# Patient Record
Sex: FEMALE | Race: WHITE | NOT HISPANIC OR LATINO | Employment: FULL TIME | URBAN - METROPOLITAN AREA
[De-identification: names, ages, dates, MRNs, and addresses within clinical notes are randomized per-mention and may not be internally consistent; named-entity substitution may affect disease eponyms.]

---

## 2017-07-11 ENCOUNTER — OFFICE VISIT (OUTPATIENT)
Dept: OBSTETRICS AND GYNECOLOGY | Facility: CLINIC | Age: 52
End: 2017-07-11

## 2017-07-11 VITALS
BODY MASS INDEX: 23.47 KG/M2 | DIASTOLIC BLOOD PRESSURE: 70 MMHG | SYSTOLIC BLOOD PRESSURE: 132 MMHG | WEIGHT: 116.4 LBS | HEIGHT: 59 IN

## 2017-07-11 DIAGNOSIS — Z01.419 ENCOUNTER FOR GYNECOLOGICAL EXAMINATION WITHOUT ABNORMAL FINDING: ICD-10-CM

## 2017-07-11 DIAGNOSIS — N60.11 FIBROCYSTIC BREAST CHANGES, BILATERAL: ICD-10-CM

## 2017-07-11 DIAGNOSIS — N60.12 FIBROCYSTIC BREAST CHANGES, BILATERAL: ICD-10-CM

## 2017-07-11 DIAGNOSIS — Z78.0 MENOPAUSE: Primary | ICD-10-CM

## 2017-07-11 PROCEDURE — 99396 PREV VISIT EST AGE 40-64: CPT | Performed by: OBSTETRICS & GYNECOLOGY

## 2017-07-11 RX ORDER — MONTELUKAST SODIUM 10 MG/1
1 TABLET ORAL AS NEEDED
COMMUNITY
Start: 2017-05-15 | End: 2019-08-01

## 2017-07-11 NOTE — PROGRESS NOTES
"   Chief Complaint   Patient presents with   • Gynecologic Exam       Selina Fernandez is a 51 y.o. year old  presenting to be seen for her annual exam.This patient is menopausal and is not taking estrogen/progestin therapy.  She has previously had tubal sterilization and hysteroscopy with endometrial ablation.  She is followed for stable fibrocystic changes of the breast with benign breast imaging on 7/3/2017.  She has no other gynecologic complaints.   Her mother recently fractured her hip.  SCREENING TESTS    Year 2012   Age                         PAP     Neg.                    HPV high risk                         Mammogram     benign benign                   JADE score                         Breast MRI                         Lipids                         Vitamin D                         Colonoscopy                         DEXA  Frax (hip/any)                         Ovarian Screen                           Enter the month test was performed.  If month not known, enter \"X'  · Black numbers = normal results  · Red numbers = abnormal results  · Black X = patient reported normal  · Red X - patient reported abnormal      Referred by:    Profession:    Other info:        She exercises regularly: yes.  She wears her seat belt: yes.  She has concerns about domestic violence: no.  She has noticed changes in height: no    GYN screening history:  · Last pap: was done on approximately 2016 and the result was: normal PAP.  · Last mammogram: was done on approximately 7/3/2017 and the result was: Birads II (Benign findings)..    No Additional Complaints Reported    The following portions of the patient's history were reviewed and updated as appropriate:vital signs and   She  does not have any pertinent problems on file.  She  has a past surgical history that includes  section; Tubal ligation; " "Hysteroscopy; and Endometrial ablation.  Her family history includes Diabetes in her maternal grandmother and sister; Heart disease in her maternal grandfather and mother; Stroke in her maternal grandmother and mother.  She  reports that she has never smoked. She has never used smokeless tobacco. She reports that she does not drink alcohol or use illicit drugs.  Current Outpatient Prescriptions   Medication Sig Dispense Refill   • aspirin 81 MG EC tablet Take 81 mg by mouth daily.     • montelukast (SINGULAIR) 10 MG tablet Take 1 tablet by mouth As Needed.       No current facility-administered medications for this visit.      She is allergic to codeine; sulfa antibiotics; and penicillins..    Review of Systems  A comprehensive review of systems was taken.  Constitutional: negative for fever, chills, activity change, appetite change, fatigue and unexpected weight change.  Respiratory: negative  Cardiovascular: negative  Gastrointestinal: negative  Genitourinary:negative  Musculoskeletal:negative  Behavioral/Psych: negative       /70  Ht 59\" (149.9 cm)  Wt 116 lb 6.4 oz (52.8 kg)  LMP  (LMP Unknown)  BMI 23.51 kg/m2    Physical Exam    General:  alert; cooperative; well developed; well nourished   Skin:  No suspicious lesions seen   Thyroid: normal to inspection and palpation   Lungs:  clear to auscultation bilaterally   Heart:  regular rate and rhythm, S1, S2 normal, no murmur, click, rub or gallop   Breasts:  Examined in supine position  Symmetric without masses or skin dimpling  Nipples normal without inversion, lesions or discharge  There are no palpable axillary nodes  Fibrocystic changes are present both breasts without a discrete mass   Abdomen: soft, non-tender; no masses  no umbilical or inginual hernias are present  no hepato-splenomegaly   Pelvis: Clinical staff was present for exam  External genitalia:  normal appearance of the external genitalia including Bartholin's and [place]'s " glands.  Vaginal:  normal pink mucosa without prolapse or lesions.  Cervix:  normal appearance.  Uterus:  normal size, shape and consistency. anteverted;  Adnexa:  non palpable bilaterally.  Rectal:  anus visually normal appearing. recto-vaginal exam unremarkable and confirms findings;  Rectocele GRADE 1     Lab Review   No data reviewed    Imaging  Mammogram results- benign         ASSESSMENT  Problems Addressed this Visit        Genitourinary    Menopause - Primary       Other    Fibrocystic breast changes, bilateral      Other Visit Diagnoses     Encounter for gynecological examination without abnormal finding        Relevant Orders    Liquid-based Pap Smear, Screening          PLAN    Medications prescribed this encounter:    New Medications Ordered This Visit   Medications   • montelukast (SINGULAIR) 10 MG tablet     Sig: Take 1 tablet by mouth As Needed.   · Pap test done  · Calcium, 600 mg/ Vit. D, 400 IU daily; regular weight-bearing exercise  · Follow up: 12 month(s)  *Please note that portions of this documentation may have been completed with a voice recognition program.  Efforts were made to edit this dictation, but occasional words may have been mistranscribed.       This note was electronically signed.    KELSY Jacobs MD  July 11, 2017  9:54 AM

## 2018-07-12 ENCOUNTER — OFFICE VISIT (OUTPATIENT)
Dept: OBSTETRICS AND GYNECOLOGY | Facility: CLINIC | Age: 53
End: 2018-07-12

## 2018-07-12 VITALS
DIASTOLIC BLOOD PRESSURE: 80 MMHG | HEIGHT: 59 IN | SYSTOLIC BLOOD PRESSURE: 146 MMHG | WEIGHT: 117 LBS | BODY MASS INDEX: 23.59 KG/M2

## 2018-07-12 DIAGNOSIS — N60.12 FIBROCYSTIC BREAST CHANGES, BILATERAL: ICD-10-CM

## 2018-07-12 DIAGNOSIS — N60.11 FIBROCYSTIC BREAST CHANGES, BILATERAL: ICD-10-CM

## 2018-07-12 DIAGNOSIS — Z01.419 ENCOUNTER FOR GYNECOLOGICAL EXAMINATION WITHOUT ABNORMAL FINDING: ICD-10-CM

## 2018-07-12 DIAGNOSIS — Z78.0 MENOPAUSE: Primary | ICD-10-CM

## 2018-07-12 PROCEDURE — 99396 PREV VISIT EST AGE 40-64: CPT | Performed by: OBSTETRICS & GYNECOLOGY

## 2018-07-12 RX ORDER — LISINOPRIL 2.5 MG/1
1 TABLET ORAL 2 TIMES DAILY
COMMUNITY
Start: 2018-07-05

## 2018-07-12 NOTE — PROGRESS NOTES
Chief Complaint   Patient presents with   • Gynecologic Exam       Selina Fernandez is a 52 y.o. year old  presenting to be seen for her annual exam.This patient is menopausal and does not use estrogen/progestin therapy.  She denies menopausal symptoms and denies symptoms of vaginal atrophy.  She has had tubal sterilization and a hysteroscopy/D&C/NovaSure endometrial ablation procedure.  She has no postmenopausal bleeding.  She denies bowel or urinary symptoms.    SCREENING TESTS    Year 2012   Age                         PAP      Neg.                   HPV high risk                         Mammogram      benign                   JADE score                         Breast MRI                         Lipids                         Vitamin D                         Colonoscopy                         DEXA  Frax (hip/any)                         Ovarian Screen                             She exercises regularly: yes.  She wears her seat belt: yes.  She has concerns about domestic violence: no.  She has noticed changes in height: no    GYN screening history:  · Last pap: was done on approximately 2017 and the result was: normal PAP.  · Last mammogram: was done on approximately 7/3/2017 and the result was: Birads II (Benign findings)..    No Additional Complaints Reported    The following portions of the patient's history were reviewed and updated as appropriate:vital signs and   She  does not have any pertinent problems on file.  She  has a past surgical history that includes  section; Tubal ligation; Hysteroscopy; and Endometrial ablation.  Her family history includes Diabetes in her maternal grandmother and sister; Heart disease in her maternal grandfather and mother; Stroke in her maternal grandmother and mother.  She  reports that she has never smoked. She has never used smokeless tobacco. She  "reports that she does not drink alcohol or use drugs.  Current Outpatient Prescriptions   Medication Sig Dispense Refill   • aspirin 81 MG EC tablet Take 81 mg by mouth daily.     • lisinopril (PRINIVIL,ZESTRIL) 2.5 MG tablet Take 1 tablet by mouth Daily.     • montelukast (SINGULAIR) 10 MG tablet Take 1 tablet by mouth As Needed.       No current facility-administered medications for this visit.      She is allergic to codeine; sulfa antibiotics; and penicillins..    Review of Systems  A comprehensive review of systems was taken.  Constitutional: negative for fever, chills, activity change, appetite change, fatigue and unexpected weight change.  Respiratory: negative  Cardiovascular: negative  Gastrointestinal: negative  Genitourinary:negative  Musculoskeletal:negative  Behavioral/Psych: negative       /80   Ht 149.9 cm (59\")   Wt 53.1 kg (117 lb)   LMP  (LMP Unknown) Comment: S/P NOVASURE ABLATION  BMI 23.63 kg/m²     Physical Exam    General:  alert; cooperative; well developed; well nourished   Skin:  No suspicious lesions seen   Thyroid: normal to inspection and palpation   Lungs:  clear to auscultation bilaterally   Heart:  regular rate and rhythm, S1, S2 normal, no murmur, click, rub or gallop   Breasts:  Examined in supine position  Symmetric without masses or skin dimpling  Nipples normal without inversion, lesions or discharge  There are no palpable axillary nodes  Fibrocystic changes are present both breasts without a discrete mass   Abdomen: soft, non-tender; no masses  no umbilical or inginual hernias are present  no hepato-splenomegaly   Pelvis: Clinical staff was present for exam  External genitalia:  normal appearance of the external genitalia including Bartholin's and [place]'s glands.  Vaginal:  normal pink mucosa without prolapse or lesions.  Cervix:  normal appearance.  Uterus:  normal size, shape and consistency. anteverted;  Adnexa:  non palpable bilaterally.  Rectal:  anus visually " normal appearing. recto-vaginal exam unremarkable and confirms findings;     Lab Review   No data reviewed    Imaging  Mammogram results- Birads II         ASSESSMENT  Problems Addressed this Visit        Genitourinary    Menopause - Primary       Other    Fibrocystic breast changes, bilateral      Other Visit Diagnoses     Encounter for gynecological examination without abnormal finding              PLAN    · Medications prescribed this encounter:  No orders of the defined types were placed in this encounter.  · Monthly self breast assessment and annual breast imaging  · Calcium, 600 mg/ Vit. D, 400 IU daily; regular weight-bearing exercise  · Follow up: 12 month(s)  *Please note that portions of this documentation may have been completed with a voice recognition program.  Efforts were made to edit this dictation, but occasional words may have been mistranscribed.       This note was electronically signed.    KELSY Jacobs MD  July 12, 2018  10:20 AM

## 2019-08-01 ENCOUNTER — OFFICE VISIT (OUTPATIENT)
Dept: OBSTETRICS AND GYNECOLOGY | Facility: CLINIC | Age: 54
End: 2019-08-01

## 2019-08-01 VITALS
BODY MASS INDEX: 24.03 KG/M2 | WEIGHT: 119.2 LBS | DIASTOLIC BLOOD PRESSURE: 64 MMHG | HEIGHT: 59 IN | SYSTOLIC BLOOD PRESSURE: 118 MMHG

## 2019-08-01 DIAGNOSIS — Z78.0 MENOPAUSE: Primary | ICD-10-CM

## 2019-08-01 DIAGNOSIS — N60.12 FIBROCYSTIC BREAST CHANGES, BILATERAL: ICD-10-CM

## 2019-08-01 DIAGNOSIS — N60.11 FIBROCYSTIC BREAST CHANGES, BILATERAL: ICD-10-CM

## 2019-08-01 DIAGNOSIS — Z01.419 ENCOUNTER FOR GYNECOLOGICAL EXAMINATION WITHOUT ABNORMAL FINDING: ICD-10-CM

## 2019-08-01 PROCEDURE — 99396 PREV VISIT EST AGE 40-64: CPT | Performed by: OBSTETRICS & GYNECOLOGY

## 2019-08-01 RX ORDER — HEPATITIS A VACCINE 1440 [IU]/ML
INJECTION, SUSPENSION INTRAMUSCULAR
Refills: 0 | COMMUNITY
Start: 2019-06-21

## 2019-08-01 NOTE — PROGRESS NOTES
Chief Complaint   Patient presents with   • Gynecologic Exam       Selina Fernandez is a 53 y.o. year old  presenting to be seen for her annual exam.  Shunt has a prior history of a  section, tubal sterilization, and a hysteroscopy/D&C/NovaSure endometrial ablation.  She has no postmenopausal bleeding.  She denies menopausal symptoms.  She denies bowel or urinary symptoms.    SCREENING TESTS    Year 2012   Age                         PAP      Neg.                   HPV high risk                         Mammogram        benign                 JADE score                         Breast MRI                         Lipids                         Vitamin D                         Colonoscopy                         DEXA  Frax (hip/any)                         Ovarian Screen                             She exercises regularly: yes.  She wears her seat belt: yes.  She has concerns about domestic violence: no.  She has noticed changes in height: no    GYN screening history:  · Last pap: was done on approximately 2017 and the result was: normal PAP.  · Last mammogram: was done on approximately 7/15/2019 and the result was: Birads II (Benign findings)..    No Additional Complaints Reported    The following portions of the patient's history were reviewed and updated as appropriate:vital signs and   She  has a past medical history of Enterocele, Fibrocystic breast changes, bilateral, Hypertension, Menopause, and Rectocele.  She does not have any pertinent problems on file.  She  has a past surgical history that includes  section; Tubal ligation; Hysteroscopy; and Endometrial ablation.  Her family history includes Diabetes in her maternal grandmother and sister; Heart disease in her maternal grandfather and mother; Stroke in her maternal grandmother and mother.  She  reports that she has never  "smoked. She has never used smokeless tobacco. She reports that she does not drink alcohol or use drugs.  Current Outpatient Medications   Medication Sig Dispense Refill   • aspirin 81 MG EC tablet Take 81 mg by mouth daily.     • HAVRIX 1440 EL U/ML vaccine inject 1 milliliter intramuscularly  0   • lisinopril (PRINIVIL,ZESTRIL) 2.5 MG tablet Take 1 tablet by mouth Daily.       No current facility-administered medications for this visit.      She is allergic to codeine; sulfa antibiotics; and penicillins..    Review of Systems  A comprehensive review of systems was taken.  Constitutional: negative for fever, chills, activity change, appetite change, fatigue and unexpected weight change.  Respiratory: negative  Cardiovascular: negative  Gastrointestinal: negative  Genitourinary:negative  Musculoskeletal:negative  Behavioral/Psych: negative       /64   Ht 149.9 cm (59\")   Wt 54.1 kg (119 lb 3.2 oz)   LMP  (LMP Unknown) Comment: S/P NOVASURE ABLATION  Breastfeeding? No   BMI 24.08 kg/m²     Physical Exam    General:  alert; cooperative; well developed; well nourished   Skin:  No suspicious lesions seen   Thyroid: normal to inspection and palpation   Lungs:  clear to auscultation bilaterally   Heart:  regular rate and rhythm, S1, S2 normal, no murmur, click, rub or gallop   Breasts:  Examined in supine position  Symmetric without masses or skin dimpling  Nipples normal without inversion, lesions or discharge  There are no palpable axillary nodes  Fibrocystic changes are present both breasts without a discrete mass   Abdomen: soft, non-tender; no masses  no umbilical or inguinal hernias are present  no hepato-splenomegaly   Pelvis: Clinical staff was present for exam  External genitalia:  normal appearance of the external genitalia including Bartholin's and [place]'s glands.  Vaginal:  normal pink mucosa without prolapse or lesions.  Cervix:  normal appearance.  Uterus:  normal size, shape and consistency. " anteverted;  Adnexa:  non palpable bilaterally.  Rectal:  anus visually normal appearing. recto-vaginal exam unremarkable and confirms findings;     Lab Review   No data reviewed    Imaging  Mammogram results- Birads II         ASSESSMENT  Problems Addressed this Visit        Genitourinary    Menopause - Primary       Other    Fibrocystic breast changes, bilateral      Other Visit Diagnoses     Encounter for gynecological examination without abnormal finding              PLAN    · Medications prescribed this encounter:  No orders of the defined types were placed in this encounter.  · Monthly self breast assessment and annual breast imaging  · Calcium, 600 mg/ Vit. D, 400 IU daily; regular weight-bearing exercise  · Follow up: 12 month(s)  *Please note that portions of this documentation may have been completed with a voice recognition program.  Efforts were made to edit this dictation, but occasional words may have been mistranscribed.       This note was electronically signed.    KELSY Jacobs MD  August 1, 2019  9:45 AM

## 2020-08-04 ENCOUNTER — OFFICE VISIT (OUTPATIENT)
Dept: OBSTETRICS AND GYNECOLOGY | Facility: CLINIC | Age: 55
End: 2020-08-04

## 2020-08-04 VITALS
HEIGHT: 59 IN | WEIGHT: 118.2 LBS | SYSTOLIC BLOOD PRESSURE: 160 MMHG | TEMPERATURE: 97.3 F | BODY MASS INDEX: 23.83 KG/M2 | DIASTOLIC BLOOD PRESSURE: 80 MMHG

## 2020-08-04 DIAGNOSIS — N60.11 FIBROCYSTIC BREAST CHANGES, BILATERAL: ICD-10-CM

## 2020-08-04 DIAGNOSIS — N60.12 FIBROCYSTIC BREAST CHANGES, BILATERAL: ICD-10-CM

## 2020-08-04 DIAGNOSIS — Z78.0 MENOPAUSE: Primary | ICD-10-CM

## 2020-08-04 DIAGNOSIS — Z01.419 ENCOUNTER FOR GYNECOLOGICAL EXAMINATION WITHOUT ABNORMAL FINDING: ICD-10-CM

## 2020-08-04 PROCEDURE — 99396 PREV VISIT EST AGE 40-64: CPT | Performed by: OBSTETRICS & GYNECOLOGY

## 2020-08-04 NOTE — PROGRESS NOTES
Chief Complaint   Patient presents with   • Annual Exam       Selina Fernandez is a 54 y.o. year old  presenting to be seen for her annual exam.  This patient has previously had  sections and tubal sterilization.  I have done a hysteroscopy/D&C/endometrial ablation procedure on her.  She is menopausal and does not use estrogen/progestin replacement therapy.  She denies menopausal symptoms.  She denies bowel or urinary symptoms.    SCREENING TESTS    Year 2012   Age                         PAP      Neg.                   HPV high risk                         Mammogram        benign benign                JADE score                         Breast MRI                         Lipids                         Vitamin D                         Colonoscopy                         DEXA  Frax (hip/any)                         Ovarian Screen                             She exercises regularly. yes  She wears her seat belt: yes.  She has concerns about domestic violence: no.  She has noticed changes in height: no    GYN screening history:  · Last mammogram: was done on approximately 2020 and the result was: Birads II (Benign findings)..    No Additional Complaints Reported    The following portions of the patient's history were reviewed and updated as appropriate:vital signs and   She  has a past medical history of Enterocele, Fibrocystic breast changes, bilateral, Hypertension, Menopause, and Rectocele.  She does not have any pertinent problems on file.  She  has a past surgical history that includes  section; Tubal ligation; Hysteroscopy; and Endometrial ablation.  Her family history includes Diabetes in her maternal grandmother and sister; Heart disease in her maternal grandfather and mother; Stroke in her maternal grandmother and mother.  She  reports that she has never smoked. She has never used  "smokeless tobacco. She reports that she does not drink alcohol or use drugs.  Current Outpatient Medications   Medication Sig Dispense Refill   • Multiple Vitamins-Minerals (MULTIVITAMIN ADULTS 50+ PO) Take 1 tablet by mouth Daily.     • HAVRIX 1440 EL U/ML vaccine inject 1 milliliter intramuscularly  0   • lisinopril (PRINIVIL,ZESTRIL) 2.5 MG tablet Take 1 tablet by mouth Daily.       No current facility-administered medications for this visit.      She is allergic to codeine; sulfa antibiotics; and penicillins..    Review of Systems  A review of systems was taken.  She denies cough, fever, shortness of breath, and any loss of sense of taste or smell.  Constitutional: negative for fever, chills, activity change, appetite change, fatigue and unexpected weight change.  Respiratory: negative  Cardiovascular: negative  Gastrointestinal: negative  Genitourinary:negative  Musculoskeletal:negative  Behavioral/Psych: negative       /80   Temp 97.3 °F (36.3 °C)   Ht 149.9 cm (59\")   Wt 53.6 kg (118 lb 3.2 oz)   LMP  (LMP Unknown) Comment: S/P NOVASURE ABLATION  Breastfeeding No   BMI 23.87 kg/m²     Physical Exam    General:  alert; cooperative; well developed; well nourished   Skin:  No suspicious lesions seen   Thyroid: normal to inspection and palpation   Lungs:  clear to auscultation bilaterally   Heart:  regular rate and rhythm, S1, S2 normal, no murmur, click, rub or gallop   Breasts:  Examined in supine position  Symmetric without masses or skin dimpling  Nipples normal without inversion, lesions or discharge  There are no palpable axillary nodes   Abdomen: soft, non-tender; no masses  no umbilical or inguinal hernias are present  no hepato-splenomegaly   Pelvis: Clinical staff was present for exam  External genitalia:  normal appearance of the external genitalia including Bartholin's and [place]'s glands.  Vaginal:  normal pink mucosa without prolapse or lesions.  Cervix:  normal appearance.  Uterus:  " normal size, shape and consistency. anteverted;  Adnexa:  non palpable bilaterally.  Rectal:  anus visually normal appearing. recto-vaginal exam unremarkable and confirms findings;     Lab Review   No data reviewed    Imaging  Mammogram results         ASSESSMENT  Problems Addressed this Visit        Genitourinary    Menopause - Primary       Other    Fibrocystic breast changes, bilateral      Other Visit Diagnoses     Encounter for gynecological examination without abnormal finding        Relevant Orders    Liquid-based Pap Smear, Screening              Substance History:   reports that she has never smoked. She has never used smokeless tobacco.   reports that she does not drink alcohol.   reports that she does not use drugs.    Substance use counseling is not indicated based on patient history.      PLAN    · Medications prescribed this encounter:  No orders of the defined types were placed in this encounter.  · Monthly self breast assessment and annual breast imaging  · Calcium, 600 mg/ Vit. D, 400 IU daily; regular weight-bearing exercise  · Follow up: 12 month(s)  *Please note that portions of this documentation may have been completed with a voice recognition program.  Efforts were made to edit this dictation, but occasional words may have been mistranscribed.       This note was electronically signed.    KELSY Jacobs MD  August 4, 2020  09:58

## 2021-08-09 ENCOUNTER — OFFICE VISIT (OUTPATIENT)
Dept: OBSTETRICS AND GYNECOLOGY | Facility: CLINIC | Age: 56
End: 2021-08-09

## 2021-08-09 VITALS
BODY MASS INDEX: 23.18 KG/M2 | SYSTOLIC BLOOD PRESSURE: 122 MMHG | WEIGHT: 115 LBS | DIASTOLIC BLOOD PRESSURE: 70 MMHG | HEIGHT: 59 IN

## 2021-08-09 DIAGNOSIS — Z78.0 MENOPAUSE: ICD-10-CM

## 2021-08-09 DIAGNOSIS — N60.11 FIBROCYSTIC BREAST CHANGES, BILATERAL: ICD-10-CM

## 2021-08-09 DIAGNOSIS — N60.12 FIBROCYSTIC BREAST CHANGES, BILATERAL: ICD-10-CM

## 2021-08-09 DIAGNOSIS — Z01.419 ENCOUNTER FOR GYNECOLOGICAL EXAMINATION WITHOUT ABNORMAL FINDING: Primary | ICD-10-CM

## 2021-08-09 PROCEDURE — 99396 PREV VISIT EST AGE 40-64: CPT | Performed by: OBSTETRICS & GYNECOLOGY

## 2021-08-09 RX ORDER — ATORVASTATIN CALCIUM 10 MG/1
1 TABLET, FILM COATED ORAL DAILY
COMMUNITY
Start: 2021-08-03

## 2021-08-09 NOTE — PROGRESS NOTES
Chief Complaint   Patient presents with   • Annual Exam       Selina Fernandez is a 55 y.o. year old  presenting to be seen for her annual exam.  This patient has had 2  sections, tubal sterilization, and I did a hysteroscopy/D&C/endometrial ablation procedure for menorrhagia.  She has no gynecologic complaints.  She denies bowel or urinary symptoms.  She has no postmenopausal bleeding.  She has had Manisha-19 immunization.    SCREENING TESTS    Year 2012   Age                         PAP         Neg.                HPV high risk                         Mammogram         benign                JADE score                         Breast MRI                         Lipids                         Vitamin D                         Colonoscopy                         DEXA  Frax (hip/any)                         Ovarian Screen                             She exercises regularly: yes.  She wears her seat belt: yes.  She has concerns about domestic violence: no.  She has noticed changes in height: no    GYN screening history:  · Last pap: was done on approximately 2020 and the result was: normal PAP.  · Last mammogram: was done on approximately 2020 and the result was: Birads I (Normal)..    No Additional Complaints Reported    The following portions of the patient's history were reviewed and updated as appropriate:vital signs and   She  has a past medical history of Enterocele, Fibrocystic breast changes, bilateral, Hyperlipidemia, Hypertension, Menopause, and Rectocele.  She does not have any pertinent problems on file.  She  has a past surgical history that includes  section; Tubal ligation; Hysteroscopy; and Endometrial ablation.  Her family history includes Diabetes in her maternal grandmother and sister; Heart disease in her maternal grandfather and mother; Stroke in her maternal  "grandmother and mother.  She  reports that she has never smoked. She has never used smokeless tobacco. She reports that she does not drink alcohol and does not use drugs.  Current Outpatient Medications   Medication Sig Dispense Refill   • atorvastatin (LIPITOR) 10 MG tablet Take 1 tablet by mouth Daily.     • HAVRIX 1440 EL U/ML vaccine inject 1 milliliter intramuscularly  0   • lisinopril (PRINIVIL,ZESTRIL) 2.5 MG tablet Take 1 tablet by mouth 2 (two) times a day.     • Multiple Vitamins-Minerals (MULTIVITAMIN ADULTS 50+ PO) Take 1 tablet by mouth Daily.       No current facility-administered medications for this visit.     She is allergic to codeine, sulfa antibiotics, and penicillins..    Review of Systems  A review of systems was taken.  Constitutional: negative for fever, chills, activity change, appetite change, fatigue and unexpected weight change.  Respiratory: negative  Cardiovascular: negative  Gastrointestinal: negative  Genitourinary:negative  Musculoskeletal:negative  Behavioral/Psych: negative     Counseling/Anticipatory Guidance Discussed: nutrition, physical activity, healthy weight, immunizations, screenings and self-breast exam      /70   Ht 149.9 cm (59\")   Wt 52.2 kg (115 lb)   LMP  (LMP Unknown) Comment: S/P NOVASURE ABLATION  Breastfeeding No   BMI 23.23 kg/m²     BMI reviewed     MEDICALLY INDICATED   Physical Exam    Neuro: alert and oriented to person, place and time    General:  alert; cooperative; well developed; well nourished   Skin:  No suspicious lesions seen   Thyroid: normal to inspection and palpation   Lungs:  breathing is unlabored  clear to auscultation bilaterally   Heart:  regular rate and rhythm, S1, S2 normal, no murmur, click, rub or gallop  normal apical impulse   Breasts:  Examined in supine position  Symmetric without masses or skin dimpling  Nipples normal without inversion, lesions or discharge  There are no palpable axillary nodes  Fibrocystic changes are " present both breasts without a discrete mass   Abdomen: soft, non-tender; no masses  no umbilical or inguinal hernias are present  no hepato-splenomegaly   Pelvis: Clinical staff was present for exam  External genitalia:  normal appearance of the external genitalia including Bartholin's and [place]'s glands.  Vaginal:  normal pink mucosa without prolapse or lesions.  Cervix:  normal appearance.  Uterus:  normal size, shape and consistency. anteverted;  Adnexa:  non palpable bilaterally.  Rectal:  anus visually normal appearing. recto-vaginal exam unremarkable and confirms findings;     Lab Review   No data reviewed    Imaging  Mammogram results              ASSESSMENT  Problems Addressed this Visit        Genitourinary and Reproductive     Menopause    Fibrocystic breast changes, bilateral      Other Visit Diagnoses     Encounter for gynecological examination without abnormal finding    -  Primary      Diagnoses       Codes Comments    Encounter for gynecological examination without abnormal finding    -  Primary ICD-10-CM: Z01.419  ICD-9-CM: V72.31     Menopause     ICD-10-CM: Z78.0  ICD-9-CM: 627.2     Fibrocystic breast changes, bilateral     ICD-10-CM: N60.11, N60.12  ICD-9-CM: 610.1               Substance History:   reports that she has never smoked. She has never used smokeless tobacco.   reports no history of alcohol use.   reports no history of drug use.    Substance use counseling is not indicated based on patient history.      PLAN    · Medications prescribed this encounter:  No orders of the defined types were placed in this encounter.  · Monthly self breast assessment and annual breast imaging  · Calcium, 600 mg/ Vit. D, 400 IU daily; regular weight-bearing exercise  · Follow up: 12 month(s)  *Please note that portions of this documentation may have been completed with a voice recognition program.  Efforts were made to edit this dictation, but occasional words may have been mistranscribed.       This note  was electronically signed.    KELSY Jacobs MD  August 9, 2021  10:35 EDT

## 2022-09-06 ENCOUNTER — OFFICE VISIT (OUTPATIENT)
Dept: OBSTETRICS AND GYNECOLOGY | Facility: CLINIC | Age: 57
End: 2022-09-06

## 2022-09-06 VITALS
SYSTOLIC BLOOD PRESSURE: 148 MMHG | HEIGHT: 59 IN | DIASTOLIC BLOOD PRESSURE: 74 MMHG | BODY MASS INDEX: 22.42 KG/M2 | WEIGHT: 111.2 LBS

## 2022-09-06 DIAGNOSIS — Z78.0 MENOPAUSE: ICD-10-CM

## 2022-09-06 DIAGNOSIS — Z01.419 ENCOUNTER FOR GYNECOLOGICAL EXAMINATION WITHOUT ABNORMAL FINDING: Primary | ICD-10-CM

## 2022-09-06 PROCEDURE — 99396 PREV VISIT EST AGE 40-64: CPT | Performed by: NURSE PRACTITIONER

## 2022-09-06 RX ORDER — FLUTICASONE PROPIONATE 50 MCG
1 SPRAY, SUSPENSION (ML) NASAL AS NEEDED
COMMUNITY
Start: 2022-06-17

## 2022-09-06 RX ORDER — MONTELUKAST SODIUM 10 MG/1
1 TABLET ORAL DAILY
COMMUNITY
Start: 2022-08-04

## 2022-09-06 NOTE — PROGRESS NOTES
Chief Complaint  Selina Fernandez is a 56 y.o.  female presenting for Annual Exam    History of Present Illness  Selina is a very pleasant 55yo woman, , a longtime pt of Dr. Patel, here today for annual gyn exam.  Her surgical hx is listed below, and it includes CS delivery x 2, BTL, and an endometrial ablation.  She is menopausal now, and now bothered too much with any vasomotor symptoms.  She does note just a little vaginal dryness, but OTC lubricants help SA to be comfortable enough.  (She declines vaginal estrogen tx at this time.)  She is intentional re: calcium and vitamin D intake, and walking for exercise.  HTN is well controlled; lipids well controlled with a statin.  She had screening mammogram in [facility] at the appropriate time in 2022; she had additional views and an ultrasound follow up at  in August.  We got those records this morning, and she was cleared to go back to annual screening.  Other preventive health procedures are up to date as well.  Otherwise, ROS negative.    The following portions of the patient's history were reviewed and updated as appropriate: allergies, current medications, past family history, past medical history, past social history, past surgical history and problem list.    Allergies   Allergen Reactions   • Codeine Nausea And Vomiting   • Sulfa Antibiotics Nausea Only   • Penicillins Rash         Current Outpatient Medications:   •  atorvastatin (LIPITOR) 10 MG tablet, Take 1 tablet by mouth Daily., Disp: , Rfl:   •  fluticasone (FLONASE) 50 MCG/ACT nasal spray, 1 spray by Each Nare route As Needed., Disp: , Rfl:   •  HAVRIX 1440 EL U/ML vaccine, inject 1 milliliter intramuscularly, Disp: , Rfl: 0  •  lisinopril (PRINIVIL,ZESTRIL) 2.5 MG tablet, Take 1 tablet by mouth 2 (two) times a day., Disp: , Rfl:   •  montelukast (SINGULAIR) 10 MG tablet, Take 1 tablet by mouth Daily., Disp: , Rfl:   •  Multiple Vitamins-Minerals (MULTIVITAMIN ADULTS 50+ PO),  "Take 1 tablet by mouth Daily., Disp: , Rfl:     Past Medical History:   Diagnosis Date   • Asthma    • Enterocele    • Fibrocystic breast changes, bilateral    • Hyperlipidemia    • Hypertension    • Menopause    • Rectocele         Past Surgical History:   Procedure Laterality Date   •  SECTION     • ENDOMETRIAL ABLATION     • HYSTEROSCOPY     • TUBAL ABDOMINAL LIGATION         Objective  /74   Ht 149.9 cm (59\")   Wt 50.4 kg (111 lb 3.2 oz)   LMP  (LMP Unknown) Comment: S/P NOVASURE ABLATION  Breastfeeding No   BMI 22.46 kg/m²     Physical Exam  Exam conducted with a chaperone present.   Constitutional:       Appearance: Normal appearance.   HENT:      Head: Normocephalic.   Neck:      Thyroid: No thyroid mass or thyromegaly.   Cardiovascular:      Rate and Rhythm: Normal rate and regular rhythm.      Heart sounds: Normal heart sounds.   Pulmonary:      Effort: Pulmonary effort is normal.      Breath sounds: Normal breath sounds.   Chest:   Breasts:      Right: No inverted nipple, mass, nipple discharge, axillary adenopathy or supraclavicular adenopathy.      Left: No inverted nipple, mass, nipple discharge, axillary adenopathy or supraclavicular adenopathy.       Abdominal:      Palpations: Abdomen is soft. There is no mass.      Tenderness: There is no abdominal tenderness.   Genitourinary:     General: Normal vulva.      Labia:         Right: No rash, tenderness or lesion.         Left: No rash, tenderness or lesion.       Urethra: No prolapse.      Vagina: Erythema present. No vaginal discharge.      Cervix: No discharge, lesion or erythema.      Uterus: Not enlarged and not tender.       Adnexa:         Right: No mass or tenderness.          Left: No mass or tenderness.        Comments: Mild atrophic erythema; no abnormal discharge.  Anus appears wnl.  No rectal exam performed.  Lymphadenopathy:      Upper Body:      Right upper body: No supraclavicular or axillary adenopathy.      Left " upper body: No supraclavicular or axillary adenopathy.   Neurological:      Mental Status: She is alert.         Assessment/Plan   Diagnoses and all orders for this visit:    1. Encounter for gynecological examination without abnormal finding (Primary)    2. Menopause        Procedures    40 to 64: Counseling/Anticipatory Guidance Discussed: nutrition, physical activity, screenings and self-breast exam    Return in about 1 year (around 9/6/2023) for Annual physical.    Gwendolyn Restrepo, ESTELLA  09/06/2022

## 2023-12-14 ENCOUNTER — OFFICE VISIT (OUTPATIENT)
Dept: OBSTETRICS AND GYNECOLOGY | Facility: CLINIC | Age: 58
End: 2023-12-14
Payer: COMMERCIAL

## 2023-12-14 VITALS
SYSTOLIC BLOOD PRESSURE: 118 MMHG | DIASTOLIC BLOOD PRESSURE: 70 MMHG | RESPIRATION RATE: 14 BRPM | BODY MASS INDEX: 24.16 KG/M2 | WEIGHT: 119.6 LBS

## 2023-12-14 DIAGNOSIS — Z01.419 ENCOUNTER FOR GYNECOLOGICAL EXAMINATION WITHOUT ABNORMAL FINDING: ICD-10-CM

## 2023-12-14 DIAGNOSIS — Z01.419 WELL WOMAN EXAM WITH ROUTINE GYNECOLOGICAL EXAM: Primary | ICD-10-CM

## 2023-12-14 RX ORDER — ALBUTEROL SULFATE 90 UG/1
2 AEROSOL, METERED RESPIRATORY (INHALATION) EVERY 6 HOURS PRN
COMMUNITY
Start: 2023-11-08

## 2023-12-14 RX ORDER — LISINOPRIL 5 MG/1
5 TABLET ORAL 2 TIMES DAILY
COMMUNITY
Start: 2023-11-14

## 2023-12-14 NOTE — PROGRESS NOTES
Chief Complaint  Selina Fernandez is a 58 y.o.  female presenting for Gynecologic Exam (Annual, no c/c)    History of Present Illness  Selina is a 59 yo woman, , here for annual gyn exam.  Her past surgical history includes  section, bilateral tubal sterilization, and a hysteroscopy with endometrial ablation.      Her last pap 2020, wnl  Last mammogram 8/15/23, negative  Last colonoscopy  ([facility]), to repeat 10 years    The following portions of the patient's history were reviewed and updated as appropriate: allergies, current medications, past family history, past medical history, past social history, past surgical history, and problem list.    Allergies   Allergen Reactions    Codeine Nausea And Vomiting    Sulfa Antibiotics Nausea Only    Penicillins Rash         Current Outpatient Medications:     albuterol sulfate  (90 Base) MCG/ACT inhaler, Inhale 2 puffs Every 6 (Six) Hours As Needed., Disp: , Rfl:     fluticasone (FLONASE) 50 MCG/ACT nasal spray, 1 spray by Each Nare route As Needed., Disp: , Rfl:     lisinopril (PRINIVIL,ZESTRIL) 5 MG tablet, Take 1 tablet by mouth 2 (Two) Times a Day., Disp: , Rfl:     montelukast (SINGULAIR) 10 MG tablet, Take 1 tablet by mouth Daily., Disp: , Rfl:     Multiple Vitamins-Minerals (MULTIVITAMIN ADULTS 50+ PO), Take 1 tablet by mouth Daily., Disp: , Rfl:     Past Medical History:   Diagnosis Date    Asthma     Enterocele     Fibrocystic breast changes, bilateral     Gestational diabetes     Hyperlipidemia     Hypertension     Menopause     Rectocele     Varicella 1970        Past Surgical History:   Procedure Laterality Date     SECTION      ENDOMETRIAL ABLATION      HYSTEROSCOPY      TUBAL ABDOMINAL LIGATION         Objective  /70 (BP Location: Right arm, Patient Position: Sitting, Cuff Size: Adult)   Resp 14   Wt 54.3 kg (119 lb 9.6 oz)   LMP  (LMP Unknown) Comment: S/P NOVASURE ABLATION  BMI 24.16 kg/m²     Physical  Exam  Vitals and nursing note reviewed. Exam conducted with a chaperone present.   Constitutional:       General: She is not in acute distress.     Appearance: Normal appearance. She is not ill-appearing.   HENT:      Head: Normocephalic.   Neck:      Thyroid: No thyroid mass or thyromegaly.   Cardiovascular:      Rate and Rhythm: Normal rate and regular rhythm.      Heart sounds: Normal heart sounds. No murmur heard.  Pulmonary:      Effort: Pulmonary effort is normal. No respiratory distress.      Breath sounds: Normal breath sounds.   Chest:   Breasts:     Right: No inverted nipple, mass or nipple discharge.      Left: No inverted nipple, mass or nipple discharge.   Abdominal:      Palpations: Abdomen is soft. There is no mass.      Tenderness: There is no abdominal tenderness.   Genitourinary:     General: Normal vulva.      Labia:         Right: No rash, tenderness or lesion.         Left: No rash, tenderness or lesion.       Vagina: Normal. No erythema.      Cervix: No discharge, lesion or erythema.      Uterus: Not enlarged and not tender.       Adnexa:         Right: No mass or tenderness.          Left: No mass or tenderness.        Comments: Anus appears wnl.  No rectal exam performed.  Lymphadenopathy:      Upper Body:      Right upper body: No supraclavicular or axillary adenopathy.      Left upper body: No supraclavicular or axillary adenopathy.   Skin:     General: Skin is warm and dry.   Neurological:      Mental Status: She is alert and oriented to person, place, and time.   Psychiatric:         Mood and Affect: Mood normal.         Behavior: Behavior normal.         Assessment/Plan   Diagnoses and all orders for this visit:    1. Well woman exam with routine gynecological exam (Primary)  -     LIQUID-BASED PAP SMEAR WITH HPV GENOTYPING REGARDLESS OF INTERPRETATION ([facility]); Future  -     LIQUID-BASED PAP SMEAR WITH HPV GENOTYPING REGARDLESS OF INTERPRETATION ([facility])    2. Encounter for  gynecological examination without abnormal finding        Procedures    40 to 64: Counseling/Anticipatory Guidance Discussed: nutrition, physical activity, screenings, and self-breast exam    Return in about 1 year (around 12/14/2024) for Annual physical.    Gwendolyn Restrepo, APRN  12/14/2023

## 2023-12-20 LAB — REF LAB TEST METHOD: NORMAL

## 2025-03-31 NOTE — PROGRESS NOTES
"Subjective   Chief Complaint   Patient presents with    Gynecologic Exam     Annual.     Selina Fernandez is a 59 y.o. year old  menopausal female presenting to be seen for her annual exam.  She is doing well and has no complaints.     This past year she has not been on hormone replacement therapy.  She has not had any vaginal bleeding in the last 12 months.  Menopausal symptoms are not present.    Mammogram last year: patient supplied records, normal mammogram 24   Colonoscopy due    Last pap 2023: NILM, HPV negative     SEXUAL Hx:  She is currently sexually active.  In the past year there there has been NO new sexual partners.    Condoms are never used.  She would not like to be screened for STD's at today's exam.  Louann is painful: no  HEALTH Hx:  She exercises regularly: yes. Keeps her grandchildren 2 days a week! And still walks 3 miles 2-3x a week.   She wears her seat belt: yes.  She has concerns about domestic violence: no.    The following portions of the patient's history were reviewed and updated as appropriate:problem list, current medications, allergies, past family history, past medical history, past social history, and past surgical history.    Social History    Tobacco Use      Smoking status: Never      Smokeless tobacco: Never         Objective   /72 (BP Location: Left arm, Patient Position: Sitting, Cuff Size: Adult)   Ht 149.9 cm (59\")   Wt 54.1 kg (119 lb 3.2 oz)   Breastfeeding No   BMI 24.08 kg/m²     General:  well developed; well nourished  no acute distress  mentation appropriate   Breasts:  Examined in supine position  Symmetric without masses or skin dimpling  Nipples normal without inversion, lesions or discharge  There are no palpable axillary nodes   Pelvis: Clinical staff was present for exam  External genitalia:  normal appearance of the external genitalia including Bartholin's and [place]'s glands.  :  urethral meatus normal; urethra normal:  Vaginal: "  atrophic mucosal changes are present;  Cervix:  normal appearance.  Uterus:  normal size, shape and consistency.  Adnexa:  normal bimanual exam of the adnexa.  Rectal:  digital rectal exam not performed; anus visually normal appearing.        Assessment   Annual GYN exam   Vaginal atrophy   Menopausal female currently not on HRT - without significant symptoms affecting activities of daily living  She is up to date on all relevant gynecologic and colorectal screenings     Plan   Pap was not done today.  I explained to Selina that the recommendations for Pap smear interval in a low risk patient has lengthened to 5 years time.  I told Selina she still needs to be seen in our office yearly for a full physical including breast and pelvic exam.   She was encouraged to get yearly mammograms.  She should report any palpable breast lump(s) or skin changes regardless of mammographic findings.  I explained to Selina that notification regarding her mammogram results will come from the center performing the study.  Our office will not be routinely calling with mammogram results.  It is her responsibility to make sure that the results from the mammogram are communicated to her by the breast center.  If she has any questions about the results, she is welcome to call our office anytime.  Today I discussed with Selina the total recommended calcium intake for a post-menopausal female is 1200 mg.  Ideally this should be from dietary sources.  I reviewed calcium content in various foods including milk, fortified orange juice and yogurt.  If she cannot get sufficient calcium through dietary means, it is recommended to supplement with either a multivitamin or calcium to reach her daily goal.  I also reviewed the difference in the bioavailability of calcium carbonate and calcium citrate containing supplements and the importance of taking calcium carbonate containing products with food. Finally, vitamin D's role in calcium absorption was  reviewed and a total daily vitamin D intake of 600 units was recommended.   The importance of keeping all planned follow-up and taking all medications as prescribed was emphasized.  Follow up for annual exam in 1 year or sooner PRN      No orders of the defined types were placed in this encounter.         This note was electronically signed.    Maria Eugenia Gillespie MD  April 1, 2025

## 2025-04-01 ENCOUNTER — OFFICE VISIT (OUTPATIENT)
Age: 60
End: 2025-04-01
Payer: COMMERCIAL

## 2025-04-01 VITALS
WEIGHT: 119.2 LBS | BODY MASS INDEX: 24.03 KG/M2 | HEIGHT: 59 IN | DIASTOLIC BLOOD PRESSURE: 72 MMHG | SYSTOLIC BLOOD PRESSURE: 120 MMHG

## 2025-04-01 DIAGNOSIS — N95.2 VAGINAL ATROPHY: ICD-10-CM

## 2025-04-01 DIAGNOSIS — Z01.419 WOMEN'S ANNUAL ROUTINE GYNECOLOGICAL EXAMINATION: Primary | ICD-10-CM

## 2025-04-01 RX ORDER — ASPIRIN 81 MG/1
81 TABLET ORAL DAILY
COMMUNITY

## 2025-04-01 RX ORDER — ATORVASTATIN CALCIUM 10 MG/1
10 TABLET, FILM COATED ORAL DAILY
COMMUNITY
Start: 2017-03-25

## 2025-04-01 RX ORDER — LATANOPROST 50 UG/ML
1 SOLUTION/ DROPS OPHTHALMIC DAILY
COMMUNITY
Start: 2025-03-06

## 2025-04-01 RX ORDER — BUDESONIDE AND FORMOTEROL FUMARATE DIHYDRATE 160; 4.5 UG/1; UG/1
2 AEROSOL RESPIRATORY (INHALATION)
COMMUNITY
Start: 2025-03-18